# Patient Record
Sex: FEMALE | Race: WHITE | Employment: PART TIME | URBAN - METROPOLITAN AREA
[De-identification: names, ages, dates, MRNs, and addresses within clinical notes are randomized per-mention and may not be internally consistent; named-entity substitution may affect disease eponyms.]

---

## 2023-03-08 ENCOUNTER — ROUTINE PRENATAL (OUTPATIENT)
Dept: PERINATAL CARE | Age: 28
End: 2023-03-08
Payer: COMMERCIAL

## 2023-03-08 VITALS — SYSTOLIC BLOOD PRESSURE: 126 MMHG | BODY MASS INDEX: 47.06 KG/M2 | DIASTOLIC BLOOD PRESSURE: 81 MMHG | WEIGHT: 293 LBS

## 2023-03-08 DIAGNOSIS — Z34.03 ENCOUNTER FOR SUPERVISION OF NORMAL FIRST PREGNANCY IN THIRD TRIMESTER: Primary | ICD-10-CM

## 2023-03-08 PROCEDURE — 76816 OB US FOLLOW-UP PER FETUS: CPT | Performed by: OBSTETRICS & GYNECOLOGY

## 2023-03-08 RX ORDER — DOCUSATE SODIUM 100 MG/1
100 CAPSULE, LIQUID FILLED ORAL 2 TIMES DAILY
COMMUNITY
Start: 2023-02-20

## 2023-03-08 NOTE — PROGRESS NOTES
Patient seen today for Level II ultrasound for growth. Med list updated. Pt with no c/o today; states baby is active and meeting daily kick counts. Denies vaginal bleeding, leaking of fluids, or contractions.

## 2023-03-08 NOTE — PROGRESS NOTES
[facility]    The patient was seen for a growth ultrasound. Please see the full ultrasound report under the Media tab.     Garrett Seaman MD  03/08/23

## 2023-03-30 ENCOUNTER — ROUTINE PRENATAL (OUTPATIENT)
Dept: PERINATAL CARE | Age: 28
End: 2023-03-30
Payer: COMMERCIAL

## 2023-03-30 VITALS
HEART RATE: 82 BPM | WEIGHT: 293 LBS | BODY MASS INDEX: 48.14 KG/M2 | DIASTOLIC BLOOD PRESSURE: 84 MMHG | SYSTOLIC BLOOD PRESSURE: 143 MMHG

## 2023-03-30 DIAGNOSIS — Z34.03 ENCOUNTER FOR SUPERVISION OF NORMAL FIRST PREGNANCY IN THIRD TRIMESTER: Primary | ICD-10-CM

## 2023-03-30 PROCEDURE — 76816 OB US FOLLOW-UP PER FETUS: CPT | Performed by: OBSTETRICS & GYNECOLOGY

## 2024-02-05 ENCOUNTER — OFFICE VISIT (OUTPATIENT)
Age: 29
End: 2024-02-05

## 2024-02-05 VITALS
BODY MASS INDEX: 50.92 KG/M2 | WEIGHT: 293 LBS | OXYGEN SATURATION: 96 % | SYSTOLIC BLOOD PRESSURE: 137 MMHG | DIASTOLIC BLOOD PRESSURE: 83 MMHG | HEART RATE: 81 BPM | TEMPERATURE: 98.1 F

## 2024-02-05 DIAGNOSIS — J20.9 ACUTE BRONCHITIS, UNSPECIFIED ORGANISM: ICD-10-CM

## 2024-02-05 DIAGNOSIS — R05.1 ACUTE COUGH: Primary | ICD-10-CM

## 2024-02-05 LAB
INFLUENZA VIRUS A RNA: NEGATIVE
INFLUENZA VIRUS B RNA: NEGATIVE
Lab: NORMAL
PERFORMING INSTRUMENT: NORMAL
QC PASS/FAIL: NORMAL
SARS-COV-2, POC: NORMAL

## 2024-02-05 RX ORDER — DEXTROMETHORPHAN HYDROBROMIDE AND PROMETHAZINE HYDROCHLORIDE 15; 6.25 MG/5ML; MG/5ML
5 SYRUP ORAL 4 TIMES DAILY PRN
Qty: 120 ML | Refills: 0 | Status: SHIPPED | OUTPATIENT
Start: 2024-02-05 | End: 2024-02-12

## 2024-02-05 RX ORDER — AZITHROMYCIN 250 MG/1
250 TABLET, FILM COATED ORAL SEE ADMIN INSTRUCTIONS
Qty: 6 TABLET | Refills: 0 | Status: SHIPPED | OUTPATIENT
Start: 2024-02-05 | End: 2024-02-10

## 2024-02-05 ASSESSMENT — ENCOUNTER SYMPTOMS
COUGH: 1
RHINORRHEA: 1
VOMITING: 0
WHEEZING: 0
HEARTBURN: 0
SINUS PRESSURE: 1
SHORTNESS OF BREATH: 0
DIARRHEA: 0
EYE REDNESS: 0
SORE THROAT: 0
HEMOPTYSIS: 0
ABDOMINAL PAIN: 0

## 2024-02-05 NOTE — PROGRESS NOTES
Johana Rossi (:  1995) is a 28 y.o. female,New patient, here for evaluation of the following chief complaint(s):  Cough (Cough, headache, body aches, chest congestion, SOB since yesterday)      ASSESSMENT/PLAN:  1. Acute cough    - POCT COVID-19, Antigen  - POCT Influenza A/B DNA (Alere i)  - promethazine-dextromethorphan (PROMETHAZINE-DM) 6.25-15 MG/5ML syrup; Take 5 mLs by mouth 4 times daily as needed for Cough  Dispense: 120 mL; Refill: 0    2. Acute bronchitis, unspecified organism    - azithromycin (ZITHROMAX) 250 MG tablet; Take 1 tablet by mouth See Admin Instructions for 5 days 500mg on day 1 followed by 250mg on days 2 - 5  Dispense: 6 tablet; Refill: 0       No follow-ups on file.    SUBJECTIVE/OBJECTIVE:    History provided by:  Patient  Cough  This is a new problem. The current episode started yesterday. The cough is Productive of sputum. Associated symptoms include chills, headaches, myalgias, nasal congestion and rhinorrhea. Pertinent negatives include no chest pain, ear pain, eye redness, fever, heartburn, hemoptysis, rash, sore throat, shortness of breath, sweats or wheezing (pt has a h/o asthma,finished prednisone 3 days ago).       Vitals:    24 0810   BP: 137/83   Site: Right Upper Arm   Position: Sitting   Cuff Size: Large Adult   Pulse: 81   Temp: 98.1 °F (36.7 °C)   TempSrc: Oral   SpO2: 96%   Weight: (!) 149.7 kg (330 lb)       Review of Systems   Constitutional:  Positive for activity change, appetite change, chills and fatigue. Negative for fever.   HENT:  Positive for congestion, rhinorrhea and sinus pressure. Negative for ear pain and sore throat.    Eyes:  Negative for redness.   Respiratory:  Positive for cough. Negative for hemoptysis, shortness of breath and wheezing (pt has a h/o asthma,finished prednisone 3 days ago).    Cardiovascular:  Negative for chest pain.   Gastrointestinal:  Negative for abdominal pain, diarrhea, heartburn and vomiting.   Musculoskeletal:

## 2024-05-12 ENCOUNTER — OFFICE VISIT (OUTPATIENT)
Age: 29
End: 2024-05-12

## 2024-05-12 VITALS
BODY MASS INDEX: 45.99 KG/M2 | TEMPERATURE: 98.1 F | OXYGEN SATURATION: 96 % | DIASTOLIC BLOOD PRESSURE: 75 MMHG | HEART RATE: 90 BPM | RESPIRATION RATE: 16 BRPM | SYSTOLIC BLOOD PRESSURE: 122 MMHG | WEIGHT: 293 LBS | HEIGHT: 67 IN

## 2024-05-12 DIAGNOSIS — J02.9 PHARYNGITIS, UNSPECIFIED ETIOLOGY: Primary | ICD-10-CM

## 2024-05-12 LAB — STREPTOCOCCUS A RNA: NEGATIVE

## 2024-05-12 RX ORDER — LIDOCAINE HYDROCHLORIDE 20 MG/ML
5 SOLUTION OROPHARYNGEAL PRN
Qty: 100 ML | Refills: 1 | Status: SHIPPED | OUTPATIENT
Start: 2024-05-12

## 2024-05-12 RX ORDER — ACETAMINOPHEN 500 MG
1000 TABLET ORAL 3 TIMES DAILY PRN
Qty: 180 TABLET | Refills: 0 | Status: SHIPPED | OUTPATIENT
Start: 2024-05-12

## 2024-08-15 ENCOUNTER — OFFICE VISIT (OUTPATIENT)
Age: 29
End: 2024-08-15

## 2024-08-15 VITALS
BODY MASS INDEX: 45.99 KG/M2 | HEIGHT: 67 IN | DIASTOLIC BLOOD PRESSURE: 82 MMHG | OXYGEN SATURATION: 93 % | WEIGHT: 293 LBS | SYSTOLIC BLOOD PRESSURE: 118 MMHG | RESPIRATION RATE: 18 BRPM | TEMPERATURE: 99.3 F

## 2024-08-15 DIAGNOSIS — J02.0 STREP PHARYNGITIS: Primary | ICD-10-CM

## 2024-08-15 LAB
INFLUENZA A ANTIBODY: NORMAL
INFLUENZA B ANTIBODY: NORMAL
Lab: NORMAL
PERFORMING INSTRUMENT: NORMAL
QC PASS/FAIL: NORMAL
S PYO AG THROAT QL: POSITIVE
SARS-COV-2, POC: NORMAL

## 2024-08-15 RX ORDER — ONDANSETRON 4 MG/1
4 TABLET, ORALLY DISINTEGRATING ORAL 3 TIMES DAILY PRN
Qty: 21 TABLET | Refills: 0 | Status: SHIPPED | OUTPATIENT
Start: 2024-08-15

## 2024-08-15 RX ORDER — AMOXICILLIN 500 MG/1
500 CAPSULE ORAL 2 TIMES DAILY
Qty: 20 CAPSULE | Refills: 0 | Status: SHIPPED | OUTPATIENT
Start: 2024-08-15 | End: 2024-08-25

## 2024-08-15 ASSESSMENT — ENCOUNTER SYMPTOMS
NAUSEA: 1
VOMITING: 0
DIARRHEA: 0
SHORTNESS OF BREATH: 0
SORE THROAT: 1
RHINORRHEA: 1
COUGH: 0

## 2024-08-15 NOTE — PROGRESS NOTES
Johana Rossi (:  1995) is a 29 y.o. 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 Strep pharyngitis  J02.0 POCT COVID-19, Antigen     POCT rapid strep A     POCT Influenza A/B     amoxicillin (AMOXIL) 500 MG capsule     ondansetron (ZOFRAN-ODT) 4 MG disintegrating tablet        Results for POC orders placed in visit on 08/15/24   POCT Influenza A/B   Result Value Ref Range    Influenza A Ab neg     Influenza B Ab neg    POCT rapid strep A   Result Value Ref Range    Strep A Ag Positive (A) None Detected     COVID: negative    COVID and flu are negative. Strep test is positive in the clinic today. They will be treated with Amoxicillin and Zofran. Encouraged patient to increase fluids, alternate tylenol and ibuprofen, antibiotic as prescribed, and change their toothbrush in three days. Return for worsening or persistent symptoms. Patient is understanding and agreeable to this plan.       Dx Disposition: COVID, flu  Education and handout provided on diagnosis and management of symptoms.   AVS reviewed with patient. Follow up as needed in UC or with PCP for new or worsening symptoms.   Return if symptoms worsen or fail to improve.    SUBJECTIVE/OBJECTIVE:  Patient presents to the clinic with complaints of headache, body aches, nausea, fever, and sore throat. This started yesterday. She has tried tylenol and ibuprofen with some relief.        History provided by:  Patient   used: No    Generalized Body Aches  Associated symptoms: fatigue, fever, headaches, myalgias, nausea, rhinorrhea and sore throat    Associated symptoms: no congestion, no cough, no diarrhea, no ear pain, no shortness of breath and no vomiting    Headache  Fever   Associated symptoms include headaches,

## 2025-01-27 ENCOUNTER — OFFICE VISIT (OUTPATIENT)
Age: 30
End: 2025-01-27

## 2025-01-27 VITALS
HEART RATE: 94 BPM | DIASTOLIC BLOOD PRESSURE: 82 MMHG | OXYGEN SATURATION: 98 % | SYSTOLIC BLOOD PRESSURE: 138 MMHG | BODY MASS INDEX: 45.99 KG/M2 | WEIGHT: 293 LBS | TEMPERATURE: 98 F | HEIGHT: 67 IN

## 2025-01-27 DIAGNOSIS — J06.9 VIRAL UPPER RESPIRATORY TRACT INFECTION: Primary | ICD-10-CM

## 2025-01-27 RX ORDER — ALBUTEROL SULFATE 90 UG/1
2 INHALANT RESPIRATORY (INHALATION) EVERY 4 HOURS PRN
COMMUNITY
Start: 2024-10-21

## 2025-01-27 RX ORDER — FLUTICASONE FUROATE, UMECLIDINIUM BROMIDE AND VILANTEROL TRIFENATATE 200; 62.5; 25 UG/1; UG/1; UG/1
POWDER RESPIRATORY (INHALATION)
COMMUNITY

## 2025-01-27 ASSESSMENT — ENCOUNTER SYMPTOMS
COUGH: 1
SINUS PRESSURE: 1
SINUS COMPLAINT: 1
SORE THROAT: 1

## 2025-01-27 NOTE — PROGRESS NOTES
Johana Rossi (:  1995) is a 29 y.o. female,Established patient, here for evaluation of the following chief complaint(s):  Sinus Problem (Sinus pressure, congestion, cough, headache for three days )      Assessment & Plan :  Visit Diagnoses and Associated Orders       Viral upper respiratory tract infection    -  Primary         ORDERS WITHOUT AN ASSOCIATED DIAGNOSIS    albuterol sulfate HFA (PROVENTIL;VENTOLIN;PROAIR) 108 (90 Base) MCG/ACT inhaler [43253]      TRELEGY ELLIPTA 200-62.5-25 MCG/ACT AEPB inhaler [[id number]]          Clinical exam consistent with   Viral upper respiratory infection  Two days of symptoms  Continue at home Mucinex  Drink fluids   Rest  Flonase  Cetirizine  Discussed breast feeding and cetirizine and flonase  Patient verbalized understanding of printed and verbal discharge instructions including follow up care.   Follow up with your primary care provider for persistent symptoms.     Follow up in 7 days if symptoms persist or if symptoms worsen.       Subjective :  Sinus congestion for two days.      History provided by:  Patient  Sinus Problem  This is a recurrent problem. The problem has been gradually worsening since onset. There has been no fever. Associated symptoms include coughing, headaches, sinus pressure and a sore throat. Pertinent negatives include no ear pain. Past treatments include oral decongestants. The treatment provided no relief.     HPI:   29 y.o. female presents with symptoms of Sinus Pain  Patient complains of nasal congestion and sinus pressure. Onset of symptoms was 2 days ago. Symptoms have been gradually worsening since that time. She is drinking plenty of fluids.  Past history is significant for asthma. Patient is non-smoker.         Vitals:    25 1647   BP: 134/85   Site: Left Upper Arm   Position: Sitting   Cuff Size: Large Adult   Pulse: 94   Temp: 98 °F (36.7 °C)   TempSrc: Oral   SpO2: 98%   Weight: (!) 145.2 kg (320 lb 3.2 oz)   Height: 1.702

## 2025-02-05 ENCOUNTER — OFFICE VISIT (OUTPATIENT)
Age: 30
End: 2025-02-05

## 2025-02-05 VITALS
WEIGHT: 293 LBS | OXYGEN SATURATION: 99 % | HEIGHT: 67 IN | DIASTOLIC BLOOD PRESSURE: 82 MMHG | BODY MASS INDEX: 45.99 KG/M2 | HEART RATE: 103 BPM | TEMPERATURE: 98.2 F | SYSTOLIC BLOOD PRESSURE: 140 MMHG

## 2025-02-05 DIAGNOSIS — J40 BRONCHITIS: Primary | ICD-10-CM

## 2025-02-05 DIAGNOSIS — R11.0 NAUSEA: ICD-10-CM

## 2025-02-05 RX ORDER — METHYLPREDNISOLONE 4 MG/1
TABLET ORAL
Qty: 1 KIT | Refills: 0 | Status: SHIPPED | OUTPATIENT
Start: 2025-02-05

## 2025-02-05 RX ORDER — IPRATROPIUM BROMIDE AND ALBUTEROL SULFATE 2.5; .5 MG/3ML; MG/3ML
3 SOLUTION RESPIRATORY (INHALATION)
COMMUNITY

## 2025-02-05 RX ORDER — ONDANSETRON 4 MG/1
4 TABLET, ORALLY DISINTEGRATING ORAL 3 TIMES DAILY PRN
Qty: 21 TABLET | Refills: 0 | Status: SHIPPED | OUTPATIENT
Start: 2025-02-05

## 2025-02-05 RX ORDER — AZITHROMYCIN 250 MG/1
TABLET, FILM COATED ORAL
Qty: 6 TABLET | Refills: 0 | Status: SHIPPED | OUTPATIENT
Start: 2025-02-05 | End: 2025-02-15

## 2025-02-05 ASSESSMENT — ENCOUNTER SYMPTOMS
COUGH: 1
WHEEZING: 0
APNEA: 0
GASTROINTESTINAL NEGATIVE: 1
STRIDOR: 0
SHORTNESS OF BREATH: 1
EYES NEGATIVE: 1
CHEST TIGHTNESS: 1

## 2025-02-06 NOTE — PROGRESS NOTES
Johana Rossi (:  1995) is a 29 y.o. female,Established patient, here for evaluation of the following chief complaint(s):  Cough (Pt c/o cough, chest congestion, +covid last week )      ASSESSMENT/PLAN:    ICD-10-CM    1. Bronchitis  J40       2. Nausea  R11.0           Patient is a 29-year-old came in with post COVID syndrome cough and congestion.  Patient has been treated for a viral last week.  Patient continue to be symptomatic feels short of breath.  She is not wheezing her pulse ox is 99.  Patient is afebrile.  Patient will be placed on Medrol Dosepak and a Z-Jareth since is ongoing.  I suspect is still a viral syndrome but needs to be treated since it is ongoing.  Could be a post COVID syndrome or long COVID.  Is not a differential at this time.  Patient reassured and educated follow-up.  She does have hypertension continue current medication.  We did discuss about breast-feeding which is fine since today is a 2-year-old breast-feeding for helping him sleep.    Follow up in 7 days if symptoms persist or if symptoms worsen.    SUBJECTIVE/OBJECTIVE:  The patient is a 29-year-old with cough congestion positive COVID last week.  Was seen and is on inhalers and cough medication.  There has been no fever patient is feels like she is hard to breathe.  Blood pressure has been elevated.  Pulse ox was about 99%.  Patient has no chest pain.  There is no abdominal pain no nausea no vomiting.  Patient states she is breast-feeding a 2-year-old            Vitals:    25 1917 25 1934   BP: (!) 146/86 (!) 140/82   Site: Right Upper Arm Right Upper Arm   Position: Sitting Sitting   Cuff Size: Large Adult Large Adult   Pulse: (!) 103    Temp: 98.2 °F (36.8 °C)    TempSrc: Oral    SpO2: 99%    Weight: (!) 145.2 kg (320 lb)    Height: 1.702 m (5' 7\")        Review of Systems   Constitutional: Negative.    HENT:  Positive for congestion.    Eyes: Negative.    Respiratory:  Positive for cough, chest tightness and

## 2025-08-31 ENCOUNTER — OFFICE VISIT (OUTPATIENT)
Age: 30
End: 2025-08-31

## 2025-08-31 VITALS
HEART RATE: 70 BPM | HEIGHT: 67 IN | TEMPERATURE: 97.9 F | BODY MASS INDEX: 45.99 KG/M2 | DIASTOLIC BLOOD PRESSURE: 76 MMHG | OXYGEN SATURATION: 96 % | WEIGHT: 293 LBS | SYSTOLIC BLOOD PRESSURE: 122 MMHG

## 2025-08-31 DIAGNOSIS — H10.31 ACUTE BACTERIAL CONJUNCTIVITIS OF RIGHT EYE: Primary | ICD-10-CM

## 2025-08-31 RX ORDER — CIPROFLOXACIN HYDROCHLORIDE 3.5 MG/ML
1 SOLUTION/ DROPS TOPICAL
Qty: 5 ML | Refills: 0 | Status: SHIPPED | OUTPATIENT
Start: 2025-08-31 | End: 2025-09-07

## 2025-08-31 RX ORDER — DUPILUMAB 300 MG/2ML
300 INJECTION, SOLUTION SUBCUTANEOUS
COMMUNITY
Start: 2025-08-01 | End: 2026-07-31